# Patient Record
Sex: FEMALE | Race: OTHER | URBAN - METROPOLITAN AREA
[De-identification: names, ages, dates, MRNs, and addresses within clinical notes are randomized per-mention and may not be internally consistent; named-entity substitution may affect disease eponyms.]

---

## 2021-01-22 PROBLEM — K80.50 BILIARY COLIC: Status: ACTIVE | Noted: 2021-01-22

## 2021-02-12 PROBLEM — Z90.49 S/P LAPAROSCOPIC CHOLECYSTECTOMY: Status: ACTIVE | Noted: 2021-02-12

## 2021-02-24 ENCOUNTER — OFFICE VISIT (OUTPATIENT)
Dept: OBGYN CLINIC | Facility: CLINIC | Age: 41
End: 2021-02-24
Payer: MEDICAID

## 2021-02-24 VITALS — SYSTOLIC BLOOD PRESSURE: 116 MMHG | WEIGHT: 148.63 LBS | DIASTOLIC BLOOD PRESSURE: 72 MMHG | BODY MASS INDEX: 28 KG/M2

## 2021-02-24 DIAGNOSIS — Z12.31 SCREENING MAMMOGRAM, ENCOUNTER FOR: ICD-10-CM

## 2021-02-24 DIAGNOSIS — Z01.419 ENCOUNTER FOR WELL WOMAN EXAM WITH ROUTINE GYNECOLOGICAL EXAM: Primary | ICD-10-CM

## 2021-02-24 PROCEDURE — 99386 PREV VISIT NEW AGE 40-64: CPT | Performed by: OBSTETRICS & GYNECOLOGY

## 2021-02-24 PROCEDURE — 3078F DIAST BP <80 MM HG: CPT | Performed by: OBSTETRICS & GYNECOLOGY

## 2021-02-24 PROCEDURE — 3074F SYST BP LT 130 MM HG: CPT | Performed by: OBSTETRICS & GYNECOLOGY

## 2021-02-24 NOTE — PROGRESS NOTES
HPI:    Patient ID: Shannon Monk is a 41year old year old female.     HPI  New patient well woman visit  Brings a concern that at the time of her laparoscopic cholecystectomy 3 weeks ago and a right ovarian cyst was seen and she was told to follow-up with normal.  Urethral meatus- without lesions, mass, or discharge. Urethra- normal without lesion, cyst, mass, or tenderness. Vulva- normal.  Labia majora and minora without lesions. Vagina- normal, no lesions or discharge. Moist and well supported.   Neha Answer: Yes [1]      No outpatient encounter medications on file as of 2/24/2021. No facility-administered encounter medications on file as of 2/24/2021.

## 2021-02-25 LAB — HPV I/H RISK 1 DNA SPEC QL NAA+PROBE: NEGATIVE

## 2021-03-02 ENCOUNTER — OFFICE VISIT (OUTPATIENT)
Dept: OBGYN CLINIC | Facility: CLINIC | Age: 41
End: 2021-03-02
Payer: MEDICAID

## 2021-03-02 VITALS — DIASTOLIC BLOOD PRESSURE: 74 MMHG | SYSTOLIC BLOOD PRESSURE: 118 MMHG

## 2021-03-02 DIAGNOSIS — D25.2 SUBSEROUS LEIOMYOMA OF UTERUS: Primary | ICD-10-CM

## 2021-03-02 PROBLEM — Z01.419 ENCOUNTER FOR WELL WOMAN EXAM WITH ROUTINE GYNECOLOGICAL EXAM: Status: RESOLVED | Noted: 2021-02-24 | Resolved: 2021-03-02

## 2021-03-02 PROCEDURE — 76830 TRANSVAGINAL US NON-OB: CPT | Performed by: OBSTETRICS & GYNECOLOGY

## 2021-03-02 PROCEDURE — 99212 OFFICE O/P EST SF 10 MIN: CPT | Performed by: OBSTETRICS & GYNECOLOGY

## 2021-03-02 PROCEDURE — 3078F DIAST BP <80 MM HG: CPT | Performed by: OBSTETRICS & GYNECOLOGY

## 2021-03-02 PROCEDURE — 3074F SYST BP LT 130 MM HG: CPT | Performed by: OBSTETRICS & GYNECOLOGY

## 2021-03-02 NOTE — PROGRESS NOTES
HPI:    Patient ID: Josie Hobbs is a 41year old female. HPI  GYN follow-up visit  Transvaginal ultrasound shows a 3.4 cm right fundal subserosal exophytic uterine fibroid.   Otherwise uterus is normal-appearing with a 1.4 cm posterior intramural fibro

## (undated) NOTE — LETTER
3/1/2021              [address]        [place] [facility] [id number]         Rodrigo Zhong,    Fue un placer verla. Claire resultado del Papanicol[place] es normal con virus de papilloma humano negative.  No necesita más pruebas en

## (undated) NOTE — MR AVS SNAPSHOT
After Visit Summary   2/24/2021    Becca Valderrama    MRN: [id number]           Visit Information     Date & Time  2/24/2021  2:50 PM Provider  Archana Alberts MD [address] [place], [place] [address].  Phone  [phone] It is the patient's responsibility to check with and follow their insurance company's guidelines for prior authorization for this test.  You may be held responsible for payment in full if proper authorization is not acquired.   Please contact the Patient Bu 7. Choose a Security Question and enter your Answer and click Next. This can be used at a later time if you forget your password. 8. Enter your e-mail address. You will receive e-mail notification when new information is available in [address]   9. Click S Treatment for mild illness or injury that does not require immediate attention.  Average cost  $70*   [facility]  Monday – Friday  8:00 am – 8:00 pm   Saturday – Sunday  8:00 am – 4:00 pm    [facility]  Primary Care